# Patient Record
Sex: FEMALE | Race: WHITE
[De-identification: names, ages, dates, MRNs, and addresses within clinical notes are randomized per-mention and may not be internally consistent; named-entity substitution may affect disease eponyms.]

---

## 2017-11-29 ENCOUNTER — HOSPITAL ENCOUNTER (EMERGENCY)
Dept: HOSPITAL 8 - ED | Age: 27
Discharge: HOME | End: 2017-11-29
Payer: COMMERCIAL

## 2017-11-29 VITALS — DIASTOLIC BLOOD PRESSURE: 86 MMHG | SYSTOLIC BLOOD PRESSURE: 137 MMHG

## 2017-11-29 VITALS — WEIGHT: 293 LBS | HEIGHT: 70 IN | BODY MASS INDEX: 41.95 KG/M2
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
## 2017-11-29 DIAGNOSIS — M17.12: Primary | ICD-10-CM
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
## 2017-11-29 DIAGNOSIS — E66.01: ICD-10-CM
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
## 2017-11-29 DIAGNOSIS — Z90.89: ICD-10-CM

## 2017-11-29 DIAGNOSIS — G89.11: ICD-10-CM

## 2017-11-29 DIAGNOSIS — F41.1: ICD-10-CM

## 2017-11-29 PROCEDURE — 99284 EMERGENCY DEPT VISIT MOD MDM: CPT

## 2017-11-29 PROCEDURE — 29505 APPLICATION LONG LEG SPLINT: CPT

## 2020-03-29 ENCOUNTER — HOSPITAL ENCOUNTER (EMERGENCY)
Dept: HOSPITAL 8 - ED | Age: 30
Discharge: HOME | End: 2020-03-29
Payer: SELF-PAY

## 2020-03-29 VITALS — WEIGHT: 293 LBS | HEIGHT: 70 IN | BODY MASS INDEX: 41.95 KG/M2

## 2020-03-29 VITALS — DIASTOLIC BLOOD PRESSURE: 77 MMHG | SYSTOLIC BLOOD PRESSURE: 127 MMHG

## 2020-03-29 DIAGNOSIS — Z90.89: ICD-10-CM

## 2020-03-29 DIAGNOSIS — H53.9: ICD-10-CM

## 2020-03-29 DIAGNOSIS — R42: ICD-10-CM

## 2020-03-29 DIAGNOSIS — R53.1: Primary | ICD-10-CM

## 2020-03-29 LAB
ALBUMIN SERPL-MCNC: 4.2 G/DL (ref 3.4–5)
ALP SERPL-CCNC: 131 U/L (ref 45–117)
ALT SERPL-CCNC: 57 U/L (ref 12–78)
ANION GAP SERPL CALC-SCNC: 6 MMOL/L (ref 5–15)
BASOPHILS # BLD AUTO: 0.03 X10^3/UL (ref 0–0.1)
BASOPHILS NFR BLD AUTO: 0 % (ref 0–1)
BILIRUB SERPL-MCNC: 0.5 MG/DL (ref 0.2–1)
CALCIUM SERPL-MCNC: 9.6 MG/DL (ref 8.5–10.1)
CHLORIDE SERPL-SCNC: 108 MMOL/L (ref 98–107)
CK SERPL-CCNC: 88 U/L (ref 26–192)
CREAT SERPL-MCNC: 0.8 MG/DL (ref 0.55–1.02)
EOSINOPHIL # BLD AUTO: 0.12 X10^3/UL (ref 0–0.4)
EOSINOPHIL NFR BLD AUTO: 1 % (ref 1–7)
ERYTHROCYTE [DISTWIDTH] IN BLOOD BY AUTOMATED COUNT: 12.8 % (ref 9.6–15.2)
LYMPHOCYTES # BLD AUTO: 3.25 X10^3/UL (ref 1–3.4)
LYMPHOCYTES NFR BLD AUTO: 30 % (ref 22–44)
MCH RBC QN AUTO: 30.7 PG (ref 27–34.8)
MCHC RBC AUTO-ENTMCNC: 33.7 G/DL (ref 32.4–35.8)
MCV RBC AUTO: 91.3 FL (ref 80–100)
MD: NO
MONOCYTES # BLD AUTO: 0.82 X10^3/UL (ref 0.2–0.8)
MONOCYTES NFR BLD AUTO: 8 % (ref 2–9)
NEUTROPHILS # BLD AUTO: 6.82 X10^3/UL (ref 1.8–6.8)
NEUTROPHILS NFR BLD AUTO: 62 % (ref 42–75)
PLATELET # BLD AUTO: 359 X10^3/UL (ref 130–400)
PMV BLD AUTO: 7.8 FL (ref 7.4–10.4)
PROT SERPL-MCNC: 9 G/DL (ref 6.4–8.2)
RBC # BLD AUTO: 5.55 X10^6/UL (ref 3.82–5.3)

## 2020-03-29 PROCEDURE — 84443 ASSAY THYROID STIM HORMONE: CPT

## 2020-03-29 PROCEDURE — 96361 HYDRATE IV INFUSION ADD-ON: CPT

## 2020-03-29 PROCEDURE — 36415 COLL VENOUS BLD VENIPUNCTURE: CPT

## 2020-03-29 PROCEDURE — 99285 EMERGENCY DEPT VISIT HI MDM: CPT

## 2020-03-29 PROCEDURE — 82550 ASSAY OF CK (CPK): CPT

## 2020-03-29 PROCEDURE — 96374 THER/PROPH/DIAG INJ IV PUSH: CPT

## 2020-03-29 PROCEDURE — 93005 ELECTROCARDIOGRAM TRACING: CPT

## 2020-03-29 PROCEDURE — 84703 CHORIONIC GONADOTROPIN ASSAY: CPT

## 2020-03-29 PROCEDURE — 85025 COMPLETE CBC W/AUTO DIFF WBC: CPT

## 2020-03-29 PROCEDURE — 80053 COMPREHEN METABOLIC PANEL: CPT

## 2020-10-16 ENCOUNTER — HOSPITAL ENCOUNTER (OUTPATIENT)
Dept: HOSPITAL 8 - STAR | Age: 30
Discharge: HOME | End: 2020-10-16
Payer: COMMERCIAL

## 2020-10-16 DIAGNOSIS — Z20.828: ICD-10-CM

## 2020-10-16 DIAGNOSIS — Z01.812: Primary | ICD-10-CM

## 2020-10-16 PROCEDURE — 36415 COLL VENOUS BLD VENIPUNCTURE: CPT

## 2020-10-16 PROCEDURE — 87635 SARS-COV-2 COVID-19 AMP PRB: CPT

## 2020-10-20 ENCOUNTER — HOSPITAL ENCOUNTER (OUTPATIENT)
Dept: HOSPITAL 8 - OUT | Age: 30
Discharge: HOME | End: 2020-10-20
Attending: INTERNAL MEDICINE
Payer: COMMERCIAL

## 2020-10-20 VITALS — HEIGHT: 70 IN | BODY MASS INDEX: 41.95 KG/M2 | WEIGHT: 293 LBS

## 2020-10-20 VITALS — SYSTOLIC BLOOD PRESSURE: 155 MMHG | DIASTOLIC BLOOD PRESSURE: 95 MMHG

## 2020-10-20 DIAGNOSIS — F41.9: ICD-10-CM

## 2020-10-20 DIAGNOSIS — K64.0: ICD-10-CM

## 2020-10-20 DIAGNOSIS — Z87.891: ICD-10-CM

## 2020-10-20 DIAGNOSIS — Z72.89: ICD-10-CM

## 2020-10-20 DIAGNOSIS — F32.9: ICD-10-CM

## 2020-10-20 DIAGNOSIS — Z98.890: ICD-10-CM

## 2020-10-20 DIAGNOSIS — K63.89: ICD-10-CM

## 2020-10-20 DIAGNOSIS — Z88.3: ICD-10-CM

## 2020-10-20 DIAGNOSIS — Z88.8: ICD-10-CM

## 2020-10-20 DIAGNOSIS — K29.50: ICD-10-CM

## 2020-10-20 DIAGNOSIS — K62.5: Primary | ICD-10-CM

## 2020-10-20 LAB — HCG UR SG: 1.03 (ref 1–1.03)

## 2020-10-20 PROCEDURE — 43239 EGD BIOPSY SINGLE/MULTIPLE: CPT

## 2020-10-20 PROCEDURE — 88305 TISSUE EXAM BY PATHOLOGIST: CPT

## 2020-10-20 PROCEDURE — 45380 COLONOSCOPY AND BIOPSY: CPT

## 2020-10-20 PROCEDURE — 81025 URINE PREGNANCY TEST: CPT

## 2021-03-19 ENCOUNTER — HOSPITAL ENCOUNTER (EMERGENCY)
Dept: HOSPITAL 8 - ED | Age: 31
Discharge: HOME | End: 2021-03-19
Payer: COMMERCIAL

## 2021-03-19 VITALS — SYSTOLIC BLOOD PRESSURE: 164 MMHG | DIASTOLIC BLOOD PRESSURE: 92 MMHG

## 2021-03-19 VITALS — HEIGHT: 70 IN | BODY MASS INDEX: 41.95 KG/M2 | WEIGHT: 293 LBS

## 2021-03-19 DIAGNOSIS — E11.9: ICD-10-CM

## 2021-03-19 DIAGNOSIS — Z00.00: Primary | ICD-10-CM

## 2021-03-19 DIAGNOSIS — H92.01: ICD-10-CM

## 2021-03-19 PROCEDURE — 99281 EMR DPT VST MAYX REQ PHY/QHP: CPT

## 2021-03-19 NOTE — NUR
PATIENT CLEARED FOR DISCHARGE. NO NOTED ACUTE DISTRESS. TOLERATED INTERVENTIONS 
WELL. PATIENT AMBULATORY TO DISCHARGE WITHOUT COMPLICATIONS WITH BELONGINGS.